# Patient Record
Sex: FEMALE | Race: BLACK OR AFRICAN AMERICAN | NOT HISPANIC OR LATINO | Employment: FULL TIME | URBAN - METROPOLITAN AREA
[De-identification: names, ages, dates, MRNs, and addresses within clinical notes are randomized per-mention and may not be internally consistent; named-entity substitution may affect disease eponyms.]

---

## 2023-08-21 ENCOUNTER — HOSPITAL ENCOUNTER (EMERGENCY)
Facility: HOSPITAL | Age: 38
Discharge: HOME | End: 2023-08-21
Attending: EMERGENCY MEDICINE

## 2023-08-21 VITALS
TEMPERATURE: 98 F | DIASTOLIC BLOOD PRESSURE: 70 MMHG | SYSTOLIC BLOOD PRESSURE: 124 MMHG | WEIGHT: 120 LBS | HEART RATE: 55 BPM | OXYGEN SATURATION: 99 % | RESPIRATION RATE: 16 BRPM

## 2023-08-21 DIAGNOSIS — K04.7 DENTAL INFECTION: ICD-10-CM

## 2023-08-21 DIAGNOSIS — K02.9 DENTAL CARIES: Primary | ICD-10-CM

## 2023-08-21 DIAGNOSIS — L30.5 PITYRIASIS ALBA: ICD-10-CM

## 2023-08-21 LAB
HCV AB SERPL QL IA: NORMAL
HIV 1+2 AB+HIV1 P24 AG SERPL QL IA: NORMAL

## 2023-08-21 PROCEDURE — 25000003 PHARM REV CODE 250: Performed by: EMERGENCY MEDICINE

## 2023-08-21 PROCEDURE — 86803 HEPATITIS C AB TEST: CPT | Performed by: PHYSICIAN ASSISTANT

## 2023-08-21 PROCEDURE — 99283 EMERGENCY DEPT VISIT LOW MDM: CPT

## 2023-08-21 PROCEDURE — 87389 HIV-1 AG W/HIV-1&-2 AB AG IA: CPT | Performed by: PHYSICIAN ASSISTANT

## 2023-08-21 RX ORDER — PENICILLIN V POTASSIUM 500 MG/1
500 TABLET, FILM COATED ORAL
Status: COMPLETED | OUTPATIENT
Start: 2023-08-21 | End: 2023-08-21

## 2023-08-21 RX ORDER — PENICILLIN V POTASSIUM 500 MG/1
500 TABLET, FILM COATED ORAL 4 TIMES DAILY
Qty: 28 TABLET | Refills: 0 | Status: SHIPPED | OUTPATIENT
Start: 2023-08-21 | End: 2023-08-28

## 2023-08-21 RX ORDER — TRAMADOL HYDROCHLORIDE 50 MG/1
50 TABLET ORAL EVERY 6 HOURS
COMMUNITY

## 2023-08-21 RX ADMIN — PENICILLIN V POTASSIUM 500 MG: 500 TABLET, FILM COATED ORAL at 06:08

## 2023-08-21 NOTE — ED PROVIDER NOTES
Encounter Date: 8/21/2023       History     Chief Complaint   Patient presents with    Dental Pain     Swelling and pain to right side of face. Reports draining in mouth.      Yessy Esqueda is a 38 F no significant past medical history presenting today for dental infection.  Patient states she is had intermittent dental infection of her right upper tooth for the past 8 months.  She is not seen a dentist for this.  She reports occasional swelling around the gums that eventually started to drain with improvement.  She reports pain and swelling to the right gums and face that started 1 week ago, started having draining of the gum 3 days ago.  No sore throat but does report mild neck pain.  No headache.  She also reports a rash on her back, torso near her waist and under her breasts that has been present for several months.  No fevers or chills.  She is concerned that she has a blood clot because she passed out after taking a pain pill during COVID in the squatting position.  She then passed out 3 weeks ago after taking a tramadol and xanax.  She is not on OCPs, no recent surgical history, no prolonged history of immobilization      Review of patient's allergies indicates:  No Known Allergies  No past medical history on file.  No past surgical history on file.  No family history on file.     Review of Systems    Physical Exam     Initial Vitals [08/21/23 1615]   BP Pulse Resp Temp SpO2   128/70 80 18 98.3 °F (36.8 °C) 100 %      MAP       --         Physical Exam    Nursing note and vitals reviewed.  Constitutional: She appears well-developed and well-nourished. No distress.   HENT:   + tooth 2 is cracked with gum erythema, no fluctuance or drainage noted.    Uvula midline, no posterior oropharyngeal erythema.  No tonsillar exudates.  No lymphadenopathy.   Eyes: Conjunctivae are normal. No scleral icterus.   Neck: No JVD present.   Cardiovascular:  Normal rate, regular rhythm and intact distal pulses.            Pulmonary/Chest: Breath sounds normal. No respiratory distress. She has no wheezes. She has no rales.   Abdominal: Abdomen is soft. Bowel sounds are normal. She exhibits no distension. There is no abdominal tenderness. There is no rebound.   Musculoskeletal:         General: No edema.      Comments: No calf asymmetry or tenderness.  No edema noted.     Lymphadenopathy:     She has no cervical adenopathy.   Neurological: She is alert and oriented to person, place, and time.   Skin: Skin is warm. No rash noted.   Scattered hypopigmentation on the back, no herald patch.  No palm or sole involvement.  No rash noted to the waist or under the breast.         ED Course   Procedures  Labs Reviewed   HIV 1 / 2 ANTIBODY   HEPATITIS C ANTIBODY          Imaging Results    None          Medications - No data to display  Medical Decision Making  Urgent evaluation 38-year-old female presenting today with multiple complaints including dental infection, rash and concern for blood clot.    Vital signs stable    Well-appearing, no distress    Exam as above, differential includes but not limited to dental infection, pityriasis, contact dermatitis    There is no evidence of a dental abscess, peritonsillar abscess, tonsillitis on exam.    Patient given dental resources.  Will discharge with prescription for penicillin.  Pityriasis alba is self-limiting.    Discussed her concerns for blood clot, reassured that her exam and history is not consistent with such diagnosis.    Patient stable for discharge    Risk  Prescription drug management.                               Clinical Impression:   Final diagnoses:  [K02.9] Dental caries (Primary)  [K04.7] Dental infection  [L30.5] Pityriasis alba        ED Disposition Condition    Discharge Stable          ED Prescriptions       Medication Sig Dispense Start Date End Date Auth. Provider    penicillin v potassium (VEETID) 500 MG tablet Take 1 tablet (500 mg total) by mouth 4 (four) times daily.  for 7 days 28 tablet 8/21/2023 8/28/2023 Katie Paz MD          Follow-up Information       Follow up With Specialties Details Why Contact Info    [facility] Emergency Medicine  If symptoms worsen [id number]  [facility] [address] [phone]        Please follow-up with a dentist regarding her dental pain.             Katie Paz MD  08/21/23 2051

## 2023-08-21 NOTE — ED NOTES
Patient identifiers verified and correct for  Ms Yin  C/C:  Dental pain SEE NN  APPEARANCE: awake and alert in NAD. PAIN  10/10  SKIN: warm, dry and intact. No breakdown or bruising.  MUSCULOSKELETAL: Patient moving all extremities spontaneously, no obvious swelling or deformities noted. Ambulates independently.  RESPIRATORY: Denies shortness of breath.Respirations unlabored.   CARDIAC: Denies CP, 2+ distal pulses; no peripheral edema  ABDOMEN: S/ND/NT, Denies nausea  : voids spontaneously, denies difficulty  Neurologic: AAO x 4; follows commands equal strength in all extremities; denies numbness/tingling. Denies dizziness Denies weakness, reports all over warm feeling

## 2023-08-21 NOTE — DISCHARGE INSTRUCTIONS
I have given you a prescription for penicillin to take for your tooth infection.  Please make an appointment with a dentist regarding her tooth pain.  I have given you a list of resources for follow-up.

## 2023-08-22 NOTE — ED NOTES
Discharge home, states understanding to follow up as directed. Ambulates out of ED without difficulty. ALL INFORMATION PER PROVIDER STAFF< med before dischagre

## 2024-04-01 ENCOUNTER — HOSPITAL ENCOUNTER (EMERGENCY)
Facility: HOSPITAL | Age: 39
Discharge: HOME | End: 2024-04-01
Attending: EMERGENCY MEDICINE
Payer: COMMERCIAL

## 2024-04-01 VITALS
SYSTOLIC BLOOD PRESSURE: 150 MMHG | TEMPERATURE: 98 F | RESPIRATION RATE: 20 BRPM | HEART RATE: 109 BPM | OXYGEN SATURATION: 99 % | DIASTOLIC BLOOD PRESSURE: 81 MMHG | WEIGHT: 120 LBS

## 2024-04-01 DIAGNOSIS — K04.7 DENTAL ABSCESS: Primary | ICD-10-CM

## 2024-04-01 PROCEDURE — 99284 EMERGENCY DEPT VISIT MOD MDM: CPT

## 2024-04-01 PROCEDURE — 25000003 PHARM REV CODE 250: Performed by: EMERGENCY MEDICINE

## 2024-04-01 RX ORDER — OXYCODONE AND ACETAMINOPHEN 5; 325 MG/1; MG/1
1 TABLET ORAL
Status: COMPLETED | OUTPATIENT
Start: 2024-04-01 | End: 2024-04-01

## 2024-04-01 RX ORDER — IBUPROFEN 800 MG/1
800 TABLET ORAL 3 TIMES DAILY
COMMUNITY

## 2024-04-01 RX ORDER — AMOXICILLIN 500 MG/1
500 CAPSULE ORAL 3 TIMES DAILY
Qty: 21 CAPSULE | Refills: 0 | Status: SHIPPED | OUTPATIENT
Start: 2024-04-01 | End: 2024-04-08

## 2024-04-01 RX ORDER — AMOXICILLIN 500 MG/1
500 CAPSULE ORAL
Status: COMPLETED | OUTPATIENT
Start: 2024-04-01 | End: 2024-04-01

## 2024-04-01 RX ORDER — OXYCODONE HYDROCHLORIDE 5 MG/1
5 TABLET ORAL EVERY 6 HOURS PRN
Qty: 10 TABLET | Refills: 0 | Status: SHIPPED | OUTPATIENT
Start: 2024-04-01

## 2024-04-01 RX ADMIN — OXYCODONE HYDROCHLORIDE AND ACETAMINOPHEN 1 TABLET: 5; 325 TABLET ORAL at 11:04

## 2024-04-01 RX ADMIN — AMOXICILLIN 500 MG: 500 CAPSULE ORAL at 11:04

## 2024-04-02 NOTE — ED NOTES
"Patient states " abscess" to right face x 5 daysm states face swelling noted worse upon waking in the am, states left flank pain x 2 months Taking street Tramadol as Motrin 800mg not working  "

## 2024-04-02 NOTE — ED NOTES
Patient identifiers verified and correct for Esqueda  C/C:  Flank pain, right facial pain SEE NN  APPEARANCE: awake and alert in NAD. PAIN  10/10  SKIN: warm, dry and intact. No breakdown or bruising.  MUSCULOSKELETAL: Patient moving all extremities spontaneously, no obvious swelling or deformities noted. Ambulates independently.  RESPIRATORY: Denies shortness of breath.Respirations unlabored.   CARDIAC: Denies CP, 2+ distal pulses; no peripheral edema  ABDOMEN: S/ND/NT, Denies nausea  : voids spontaneously, denies difficulty  Neurologic: AAO x 4; follows commands equal strength in all extremities; denies numbness/tingling. Denies dizziness  Denies new weakness

## 2024-04-02 NOTE — ED PROVIDER NOTES
Encounter Date: 4/1/2024       History     Chief Complaint   Patient presents with    Dental Problem     Reports having dental abscess causing swelling to right eye. No swelling noted in triage      HPI  38-year-old female, otherwise healthy, presents with dental pain.  About a month ago she noticed some dental pain on the right.  She was able to ignore it and it went away.  Came back about a week ago and was very severe.  She feels like there was an infection there which she thought would get better.  However, has noted increasing pain in the right upper mandible.  Radiates to under her eye into her head.  Hurts to chew.  No swelling in her mouth.  Able to swallow and breathe.  No pain with neck range of motion.  No fevers.  Has felt achy and generally bad but no true fevers.  Does have a broken tooth on the right side.  Reports ibuprofen not helping pain.      Review of patient's allergies indicates:  No Known Allergies  History reviewed. No pertinent past medical history.  History reviewed. No pertinent surgical history.  History reviewed. No pertinent family history.  Social History     Tobacco Use    Smoking status: Every Day     Types: Cigarettes    Smokeless tobacco: Never   Substance Use Topics    Alcohol use: Yes    Drug use: Not Currently     Comment: Tramadol     Review of Systems    Physical Exam     Initial Vitals [04/01/24 2111]   BP Pulse Resp Temp SpO2   (!) 150/81 109 15 98.3 °F (36.8 °C) 99 %      MAP       --         Physical Exam    Nursing note and vitals reviewed.  Constitutional: She appears well-developed and well-nourished. No distress.   HENT:   Head: Normocephalic and atraumatic.   Nose: Nose normal.   Broken tooth in the right upper molar.  Tender to palpation.  No drainable abscess.  No submandibular swelling.  Normal posterior oropharynx.  Normal voice.  Normal neck range of motion.  Very tender to palpate on the right maxilla.  No obvious facial swelling or erythema.  No periorbital  swelling.  Normal ocular range of motion bilaterally   Eyes: Conjunctivae and EOM are normal. Pupils are equal, round, and reactive to light.   Neck:   Normal range of motion.  Cardiovascular:  Normal rate, regular rhythm and normal heart sounds.           Pulmonary/Chest: Breath sounds normal. No respiratory distress.   Abdominal: Abdomen is soft. She exhibits no distension.   Musculoskeletal:         General: Normal range of motion.      Cervical back: Normal range of motion.     Neurological: She is alert and oriented to person, place, and time.   Skin: Skin is warm and dry.         ED Course   Procedures  Labs Reviewed - No data to display       Imaging Results    None          Medications   oxyCODONE-acetaminophen 5-325 mg per tablet 1 tablet (has no administration in time range)   amoxicillin capsule 500 mg (has no administration in time range)     Medical Decision Making  38-year-old female who presents with right dental pain.  Based on history and exam I highly suspect she has a dental abscess.  No evidence of Ray's angina or deep neck space infection.  No evidence of preseptal or orbital cellulitis.  TMs are normal bilaterally.  We will treat with short course of pain medications and with a course of amoxicillin.  She was referred to the [facility] dental clinic.  Return precautions given.    Risk  Prescription drug management.                                      Clinical Impression:  Final diagnoses:  [K04.7] Dental abscess (Primary)          ED Disposition Condition    Discharge Stable          ED Prescriptions       Medication Sig Dispense Start Date End Date Auth. Provider    amoxicillin (AMOXIL) 500 MG capsule Take 1 capsule (500 mg total) by mouth 3 (three) times daily. for 7 days 21 capsule 4/1/2024 4/8/2024 Aurelia Boyd MD    oxyCODONE (ROXICODONE) 5 MG immediate release tablet Take 1 tablet (5 mg total) by mouth every 6 (six) hours as needed for Pain. 10 tablet 4/1/2024 -- Aurelia Boyd MD           Follow-up Information       Follow up With Specialties Details Why Contact Info    Dentistry, [facility] Schedule an appointment as soon as possible for a visit   [address] [facility] [address]  [phone]               Aurelia Boyd MD  04/01/24 0530